# Patient Record
Sex: MALE | Race: WHITE | NOT HISPANIC OR LATINO | Employment: OTHER | URBAN - METROPOLITAN AREA
[De-identification: names, ages, dates, MRNs, and addresses within clinical notes are randomized per-mention and may not be internally consistent; named-entity substitution may affect disease eponyms.]

---

## 2023-08-15 ENCOUNTER — HOSPITAL ENCOUNTER (OUTPATIENT)
Dept: DATA CONVERSION | Facility: HOSPITAL | Age: 73
Discharge: HOME | End: 2023-08-15

## 2023-08-15 DIAGNOSIS — J44.9 CHRONIC OBSTRUCTIVE PULMONARY DISEASE, UNSPECIFIED (MULTI): ICD-10-CM

## 2023-08-15 DIAGNOSIS — E78.00 PURE HYPERCHOLESTEROLEMIA, UNSPECIFIED: ICD-10-CM

## 2023-08-15 DIAGNOSIS — E53.8 DEFICIENCY OF OTHER SPECIFIED B GROUP VITAMINS: ICD-10-CM

## 2023-08-15 DIAGNOSIS — R73.01 IMPAIRED FASTING GLUCOSE: ICD-10-CM

## 2023-08-15 LAB
ALBUMIN SERPL-MCNC: 3.9 GM/DL (ref 3.5–5)
ALBUMIN/GLOB SERPL: 1.2 RATIO (ref 1.5–3)
ALP BLD-CCNC: 105 U/L (ref 35–125)
ALT SERPL-CCNC: 25 U/L (ref 5–40)
ANION GAP SERPL CALCULATED.3IONS-SCNC: 10 MMOL/L (ref 0–19)
AST SERPL-CCNC: 30 U/L (ref 5–40)
BILIRUB SERPL-MCNC: 0.3 MG/DL (ref 0.1–1.2)
BUN SERPL-MCNC: 15 MG/DL (ref 8–25)
BUN/CREAT SERPL: 10.7 RATIO (ref 8–21)
CALCIUM SERPL-MCNC: 9.1 MG/DL (ref 8.5–10.4)
CHLORIDE SERPL-SCNC: 107 MMOL/L (ref 97–107)
CO2 SERPL-SCNC: 26 MMOL/L (ref 24–31)
CREAT SERPL-MCNC: 1.4 MG/DL (ref 0.4–1.6)
DEPRECATED RDW RBC AUTO: 45.1 FL (ref 37–54)
ERYTHROCYTE [DISTWIDTH] IN BLOOD BY AUTOMATED COUNT: 13.5 % (ref 11.7–15)
GFR SERPL CREATININE-BSD FRML MDRD: 54 ML/MIN/1.73 M2
GLOBULIN SER-MCNC: 3.2 G/DL (ref 1.9–3.7)
GLUCOSE SERPL-MCNC: 123 MG/DL (ref 65–99)
HBA1C MFR BLD: 5.8 % (ref 4–6)
HCT VFR BLD AUTO: 42.2 % (ref 41–50)
HGB BLD-MCNC: 13.7 GM/DL (ref 13.5–16.5)
LIPASE SERPL-CCNC: 22 U/L (ref 16–63)
MCH RBC QN AUTO: 29.7 PG (ref 26–34)
MCHC RBC AUTO-ENTMCNC: 32.5 % (ref 31–37)
MCV RBC AUTO: 91.3 FL (ref 80–100)
NRBC BLD-RTO: 0 /100 WBC
PLATELET # BLD AUTO: 127 K/UL (ref 150–450)
PMV BLD AUTO: 9.8 CU (ref 7–12.6)
POTASSIUM SERPL-SCNC: 4.1 MMOL/L (ref 3.4–5.1)
PROT SERPL-MCNC: 7.1 G/DL (ref 5.9–7.9)
RBC # BLD AUTO: 4.62 M/UL (ref 4.5–5.5)
SODIUM SERPL-SCNC: 143 MMOL/L (ref 133–145)
TSH SERPL DL<=0.05 MIU/L-ACNC: 1.3 MIU/L (ref 0.27–4.2)
VIT B12 SERPL-MCNC: 1004 PG/ML (ref 211–946)
WBC # BLD AUTO: 7.7 K/UL (ref 4.5–11)

## 2023-08-29 ENCOUNTER — HOSPITAL ENCOUNTER (OUTPATIENT)
Dept: DATA CONVERSION | Facility: HOSPITAL | Age: 73
End: 2023-08-29

## 2023-08-29 DIAGNOSIS — R91.1 SOLITARY PULMONARY NODULE: ICD-10-CM

## 2023-09-12 PROBLEM — M19.90 ARTHRITIS: Status: ACTIVE | Noted: 2023-09-12

## 2023-09-12 PROBLEM — M19.90 ARTHROSIS: Status: ACTIVE | Noted: 2023-09-12

## 2023-09-12 PROBLEM — E83.52 HYPERCALCEMIA: Status: ACTIVE | Noted: 2023-09-12

## 2023-09-12 PROBLEM — J84.10 PULMONARY FIBROSIS (MULTI): Status: ACTIVE | Noted: 2023-09-12

## 2023-09-12 PROBLEM — M48.061 SPINAL STENOSIS OF LUMBAR REGION: Status: ACTIVE | Noted: 2023-09-12

## 2023-09-12 PROBLEM — E04.2 NONTOXIC MULTINODULAR GOITER: Status: ACTIVE | Noted: 2023-09-12

## 2023-09-12 PROBLEM — H81.10 BPPV (BENIGN PAROXYSMAL POSITIONAL VERTIGO): Status: ACTIVE | Noted: 2023-09-12

## 2023-09-12 PROBLEM — I10 ESSENTIAL HYPERTENSION: Status: ACTIVE | Noted: 2023-09-12

## 2023-09-12 PROBLEM — F41.9 ANXIETY: Status: ACTIVE | Noted: 2023-09-12

## 2023-09-12 PROBLEM — H90.3 BILATERAL SENSORINEURAL HEARING LOSS: Status: ACTIVE | Noted: 2023-09-12

## 2023-09-12 PROBLEM — N18.30 STAGE 3 CHRONIC KIDNEY DISEASE (MULTI): Status: ACTIVE | Noted: 2023-09-12

## 2023-09-12 PROBLEM — N62 GYNECOMASTIA: Status: ACTIVE | Noted: 2023-09-12

## 2023-09-12 PROBLEM — E53.8 VITAMIN B12 DEFICIENCY (NON ANEMIC): Status: ACTIVE | Noted: 2023-09-12

## 2023-09-12 PROBLEM — F43.20 ADJUSTMENT DISORDER: Status: ACTIVE | Noted: 2023-09-12

## 2023-09-12 PROBLEM — D69.6 THROMBOCYTOPENIC DISORDER (CMS-HCC): Status: ACTIVE | Noted: 2023-09-12

## 2023-09-12 PROBLEM — K21.9 GASTROESOPHAGEAL REFLUX DISEASE: Status: ACTIVE | Noted: 2023-09-12

## 2023-09-12 PROBLEM — J44.9 CHRONIC OBSTRUCTIVE PULMONARY DISEASE (MULTI): Status: ACTIVE | Noted: 2023-09-12

## 2023-09-12 PROBLEM — E78.00 PURE HYPERCHOLESTEROLEMIA: Status: ACTIVE | Noted: 2023-09-12

## 2023-09-12 PROBLEM — F32.9 MAJOR DEPRESSIVE DISORDER, SINGLE EPISODE, UNSPECIFIED: Status: ACTIVE | Noted: 2023-09-12

## 2023-09-12 RX ORDER — LANOLIN ALCOHOL/MO/W.PET/CERES
1000 CREAM (GRAM) TOPICAL DAILY
COMMUNITY
Start: 2022-05-18

## 2023-09-12 RX ORDER — ASCORBIC ACID 500 MG
500 TABLET ORAL DAILY
COMMUNITY

## 2023-09-12 RX ORDER — METOPROLOL SUCCINATE 50 MG/1
50 TABLET, EXTENDED RELEASE ORAL DAILY
COMMUNITY
Start: 2022-07-05 | End: 2024-04-08

## 2023-09-12 RX ORDER — FAMOTIDINE 20 MG/1
2 TABLET, FILM COATED ORAL DAILY
COMMUNITY
Start: 2021-02-19 | End: 2023-10-26 | Stop reason: ALTCHOICE

## 2023-09-12 RX ORDER — ACETAMINOPHEN 500 MG
1 TABLET ORAL DAILY
COMMUNITY

## 2023-09-12 RX ORDER — BUPROPION HYDROCHLORIDE 150 MG/1
150 TABLET ORAL DAILY
COMMUNITY
End: 2023-10-26 | Stop reason: ALTCHOICE

## 2023-09-12 RX ORDER — VILAZODONE HYDROCHLORIDE 10 MG/1
10 TABLET ORAL
COMMUNITY
End: 2023-10-26 | Stop reason: ALTCHOICE

## 2023-09-12 RX ORDER — MAGNESIUM 250 MG
1 TABLET ORAL DAILY
COMMUNITY
End: 2023-10-26 | Stop reason: ALTCHOICE

## 2023-10-25 ENCOUNTER — HOSPITAL ENCOUNTER (EMERGENCY)
Facility: HOSPITAL | Age: 73
Discharge: HOME | End: 2023-10-25
Attending: EMERGENCY MEDICINE
Payer: MEDICARE

## 2023-10-25 ENCOUNTER — APPOINTMENT (OUTPATIENT)
Dept: RADIOLOGY | Facility: HOSPITAL | Age: 73
End: 2023-10-25
Payer: MEDICARE

## 2023-10-25 VITALS
WEIGHT: 160 LBS | HEIGHT: 68 IN | TEMPERATURE: 98.1 F | SYSTOLIC BLOOD PRESSURE: 141 MMHG | RESPIRATION RATE: 20 BRPM | OXYGEN SATURATION: 98 % | DIASTOLIC BLOOD PRESSURE: 74 MMHG | BODY MASS INDEX: 24.25 KG/M2 | HEART RATE: 70 BPM

## 2023-10-25 DIAGNOSIS — M54.50 ACUTE LEFT-SIDED LOW BACK PAIN WITHOUT SCIATICA: Primary | ICD-10-CM

## 2023-10-25 PROCEDURE — 2500000004 HC RX 250 GENERAL PHARMACY W/ HCPCS (ALT 636 FOR OP/ED): Performed by: EMERGENCY MEDICINE

## 2023-10-25 PROCEDURE — 2500000001 HC RX 250 WO HCPCS SELF ADMINISTERED DRUGS (ALT 637 FOR MEDICARE OP): Performed by: EMERGENCY MEDICINE

## 2023-10-25 PROCEDURE — 99284 EMERGENCY DEPT VISIT MOD MDM: CPT | Mod: 25

## 2023-10-25 PROCEDURE — 72131 CT LUMBAR SPINE W/O DYE: CPT | Mod: ME

## 2023-10-25 RX ORDER — HYDROCODONE BITARTRATE AND ACETAMINOPHEN 5; 325 MG/1; MG/1
1 TABLET ORAL ONCE
Status: COMPLETED | OUTPATIENT
Start: 2023-10-25 | End: 2023-10-25

## 2023-10-25 RX ORDER — DEXAMETHASONE 6 MG/1
12 TABLET ORAL ONCE
Status: COMPLETED | OUTPATIENT
Start: 2023-10-25 | End: 2023-10-25

## 2023-10-25 RX ORDER — CYCLOBENZAPRINE HCL 5 MG
5 TABLET ORAL 2 TIMES DAILY PRN
Qty: 10 TABLET | Refills: 0 | Status: SHIPPED | OUTPATIENT
Start: 2023-10-25

## 2023-10-25 RX ADMIN — HYDROCODONE BITARTRATE AND ACETAMINOPHEN 1 TABLET: 5; 325 TABLET ORAL at 14:08

## 2023-10-25 RX ADMIN — DEXAMETHASONE 12 MG: 6 TABLET ORAL at 14:07

## 2023-10-25 ASSESSMENT — PAIN DESCRIPTION - ORIENTATION: ORIENTATION: LEFT;LOWER

## 2023-10-25 ASSESSMENT — COLUMBIA-SUICIDE SEVERITY RATING SCALE - C-SSRS
1. IN THE PAST MONTH, HAVE YOU WISHED YOU WERE DEAD OR WISHED YOU COULD GO TO SLEEP AND NOT WAKE UP?: NO
2. HAVE YOU ACTUALLY HAD ANY THOUGHTS OF KILLING YOURSELF?: NO
6. HAVE YOU EVER DONE ANYTHING, STARTED TO DO ANYTHING, OR PREPARED TO DO ANYTHING TO END YOUR LIFE?: NO

## 2023-10-25 ASSESSMENT — PAIN DESCRIPTION - PROGRESSION: CLINICAL_PROGRESSION: GRADUALLY WORSENING

## 2023-10-25 ASSESSMENT — PAIN DESCRIPTION - PAIN TYPE: TYPE: ACUTE PAIN

## 2023-10-25 ASSESSMENT — PAIN SCALES - GENERAL
PAINLEVEL_OUTOF10: 5 - MODERATE PAIN
PAINLEVEL_OUTOF10: 9

## 2023-10-25 ASSESSMENT — PAIN DESCRIPTION - ONSET: ONSET: GRADUAL

## 2023-10-25 ASSESSMENT — PAIN DESCRIPTION - DESCRIPTORS: DESCRIPTORS: SHARP

## 2023-10-25 ASSESSMENT — PAIN DESCRIPTION - LOCATION: LOCATION: BACK

## 2023-10-25 ASSESSMENT — PAIN - FUNCTIONAL ASSESSMENT: PAIN_FUNCTIONAL_ASSESSMENT: 0-10

## 2023-10-25 ASSESSMENT — PAIN DESCRIPTION - FREQUENCY: FREQUENCY: CONSTANT/CONTINUOUS

## 2023-10-25 NOTE — ED PROVIDER NOTES
Pt Name: Kar Massey  MRN: [id number]  Birthdate 1950  Date of evaluation: 10/25/2023  [facility]      CHIEF COMPLAINT    Chief Complaint   Patient presents with    Back Pain        HPI  73 y.o. male presents with With left-sided low back pain symptoms started approximately 5 days ago.  Pain radiates to the left groin.  No recent injury.  No trauma.  No weakness no numbness.  No difficulty urinating no bowel incontinence.  REVIEW OF SYSTEMS     Review of Systems    Pmh:  Patient Active Problem List   Diagnosis    Adjustment disorder    Anxiety    Arthritis    Arthrosis    Bilateral sensorineural hearing loss    BPPV (benign paroxysmal positional vertigo)    Chronic obstructive pulmonary disease (CMS/HCC)    Essential hypertension    Gastroesophageal reflux disease    Gynecomastia    Hypercalcemia    Major depressive disorder, single episode, unspecified    Nontoxic multinodular goiter    Pulmonary fibrosis (CMS/HCC)    Pure hypercholesterolemia    Spinal stenosis of lumbar region    Stage 3 chronic kidney disease (CMS/HCC)    Thrombocytopenic disorder (CMS/HCC)    Vitamin B12 deficiency (non anemic)       CURRENT MEDICATIONS       Previous Medications    ASCORBIC ACID (VITAMIN C) 500 MG TABLET    Take 1 tablet (500 mg) by mouth once daily.    BUPROPION XL (WELLBUTRIN XL) 150 MG 24 HR TABLET    Take 1 tablet (150 mg) by mouth once daily.    CHOLECALCIFEROL (VITAMIN D-3) 50 MCG (2,000 UNIT) CAPSULE    Take 1 capsule (50 mcg) by mouth early in the morning..    CYANOCOBALAMIN (VITAMIN B-12) 1,000 MCG TABLET    Take 1 tablet (1,000 mcg) by mouth once daily.    FAMOTIDINE (PEPCID) 20 MG TABLET    Take 2 tablets (40 mg) by mouth once daily.    MAGNESIUM 250 MG TABLET    Take 1 tablet (250 mg) by mouth once daily.    METOPROLOL SUCCINATE XL (TOPROL-XL) 50 MG 24 HR TABLET    Take 1 tablet (50 mg) by mouth once daily.    VILAZODONE (VIIBRYD) 10 MG TABLET    Take 1 tablet (10 mg) by mouth once daily with a meal.  "      Family History   Problem Relation Name Age of Onset    Hypertension Mother      Diabetes Mother      Other (Bladder Cancer) Father      Heart disease Father         Social Determinants of Health     Tobacco Use: Not on file   Alcohol Use: Not on file   Financial Resource Strain: Not on file   Food Insecurity: Not on file   Transportation Needs: Not on file   Physical Activity: Not on file   Stress: Not on file   Social Connections: Not on file   Intimate Partner Violence: Not on file   Depression: Not on file   Housing Stability: Not on file   Utilities: Not on file   Digital Equity: Not on file       Physical Exam  Vitals and nursing note reviewed.   Constitutional:       Appearance: He is normal weight. He is not toxic-appearing.   Musculoskeletal:      Thoracic back: No tenderness.      Lumbar back: No deformity or lacerations. Negative right straight leg raise test and negative left straight leg raise test.      Left hip: No lacerations, tenderness or crepitus. Normal range of motion.   Skin:     General: Skin is warm.      Coloration: Skin is not jaundiced or pale.   Neurological:      Mental Status: He is alert.      Sensory: Sensation is intact.      Motor: Motor function is intact.      Gait: Gait is intact.      Deep Tendon Reflexes: Reflexes are normal and symmetric.   Psychiatric:         Behavior: Behavior normal.         Thought Content: Thought content normal.       Vitals:    10/25/23 1218 10/25/23 1221   BP: 141/74    BP Location: Right arm    Patient Position: Sitting    Pulse: 70    Resp: 20    Temp: 36.7 °C (98.1 °F)    TempSrc: Oral    SpO2: 98% 98%   Weight: 72.6 kg (160 lb)    Height: 1.727 m (5' 8\")              SCREENINGS        Medical Decision Making       Imgaing:  CT lumbar spine wo IV contrast   Final Result   1. Degenerative changes of the lumbar spine: Bilateral pars defects   at L5 cause grade 1 anterolisthesis of L5 on S1, with resulting mild   bilateral foraminal stenosis at " L5-S1. Additionally, grade 1   anterolisthesis of L4 on L5 causes mild bilateral foraminal at L4-L5.   No spinal canal stenosis.        2. Findings involving the pancreas suggest prior episodes of   pancreatitis.        3. Sigmoid diverticulosis.        MACRO:   None        Signed by: Dakotah Hay 10/25/2023 2:46 PM   Dictation workstation:   [id number]          Labs:      EKG:  No orders to display       Medications ordered:  Medications   dexAMETHasone (Decadron) tablet 12 mg (12 mg oral Given 10/25/23 1407)   HYDROcodone-acetaminophen (Norco) 5-325 mg per tablet 1 tablet (1 tablet oral Given 10/25/23 1408)           Orders placed during visit:  CT lumbar spine wo IV contrast   Final Result   1. Degenerative changes of the lumbar spine: Bilateral pars defects   at L5 cause grade 1 anterolisthesis of L5 on S1, with resulting mild   bilateral foraminal stenosis at L5-S1. Additionally, grade 1   anterolisthesis of L4 on L5 causes mild bilateral foraminal at L4-L5.   No spinal canal stenosis.        2. Findings involving the pancreas suggest prior episodes of   pancreatitis.        3. Sigmoid diverticulosis.        MACRO:   None        Signed by: Dakotah Hay 10/25/2023 2:46 PM   Dictation workstation:   [id number]          Diagnoses as of 10/25/23 1609   Acute left-sided low back pain without sciatica       CONSULTS:  None    CRITICAL CARE TIME          CT reviewed no AAA no fracture.  Likely radiculopathy from neural foramina stenosis      Clinical impression:  1. Acute left-sided low back pain without sciatica  cyclobenzaprine (Flexeril) 5 mg tablet          DISPOSITION/PLAN   DISPOSITION Discharge 10/25/2023 04:05:42 PM       I prescribed the following medications:  New Prescriptions    CYCLOBENZAPRINE (FLEXERIL) 5 MG TABLET    Take 1 tablet (5 mg) by mouth 2 times a day as needed for muscle spasms.       PATIENT REFERRED TO:  Edward Gagnon MD  [address] [place]  [place] [address] [phone]    In 4  days           Hiram Scott MD  10/25/23 6005

## 2023-10-26 ENCOUNTER — OFFICE VISIT (OUTPATIENT)
Dept: PRIMARY CARE | Facility: CLINIC | Age: 73
End: 2023-10-26
Payer: MEDICARE

## 2023-10-26 ENCOUNTER — LAB (OUTPATIENT)
Dept: LAB | Facility: LAB | Age: 73
End: 2023-10-26
Payer: MEDICARE

## 2023-10-26 VITALS
SYSTOLIC BLOOD PRESSURE: 120 MMHG | WEIGHT: 167 LBS | HEART RATE: 65 BPM | BODY MASS INDEX: 25.39 KG/M2 | OXYGEN SATURATION: 96 % | DIASTOLIC BLOOD PRESSURE: 60 MMHG

## 2023-10-26 DIAGNOSIS — E04.2 NONTOXIC MULTINODULAR GOITER: ICD-10-CM

## 2023-10-26 DIAGNOSIS — N18.31 STAGE 3A CHRONIC KIDNEY DISEASE (MULTI): ICD-10-CM

## 2023-10-26 DIAGNOSIS — D64.9 ANEMIA, UNSPECIFIED TYPE: ICD-10-CM

## 2023-10-26 DIAGNOSIS — J43.1 PANLOBULAR EMPHYSEMA (MULTI): ICD-10-CM

## 2023-10-26 DIAGNOSIS — G31.84 MCI (MILD COGNITIVE IMPAIRMENT): ICD-10-CM

## 2023-10-26 DIAGNOSIS — E53.8 VITAMIN B12 DEFICIENCY (NON ANEMIC): ICD-10-CM

## 2023-10-26 DIAGNOSIS — Z87.19 HISTORY OF DUODENAL ULCER: ICD-10-CM

## 2023-10-26 DIAGNOSIS — K21.9 GASTROESOPHAGEAL REFLUX DISEASE WITHOUT ESOPHAGITIS: ICD-10-CM

## 2023-10-26 DIAGNOSIS — M19.90 ARTHRITIS: ICD-10-CM

## 2023-10-26 DIAGNOSIS — J84.10 PULMONARY FIBROSIS (MULTI): ICD-10-CM

## 2023-10-26 DIAGNOSIS — E78.00 PURE HYPERCHOLESTEROLEMIA: ICD-10-CM

## 2023-10-26 DIAGNOSIS — Z12.5 SCREENING FOR PROSTATE CANCER: ICD-10-CM

## 2023-10-26 DIAGNOSIS — Z87.19 HISTORY OF COMMON DUODENAL ULCER: ICD-10-CM

## 2023-10-26 DIAGNOSIS — I12.9 BENIGN HYPERTENSIVE KIDNEY DISEASE WITH CHRONIC KIDNEY DISEASE STAGE I THROUGH STAGE IV, OR UNSPECIFIED(403.10): ICD-10-CM

## 2023-10-26 DIAGNOSIS — M54.16 LUMBAR RADICULOPATHY: Primary | ICD-10-CM

## 2023-10-26 LAB
ANION GAP SERPL CALC-SCNC: 14 MMOL/L
BUN SERPL-MCNC: 17 MG/DL (ref 8–25)
CALCIUM SERPL-MCNC: 9.4 MG/DL (ref 8.5–10.4)
CHLORIDE SERPL-SCNC: 107 MMOL/L (ref 97–107)
CO2 SERPL-SCNC: 22 MMOL/L (ref 24–31)
CREAT SERPL-MCNC: 1.4 MG/DL (ref 0.4–1.6)
ERYTHROCYTE [DISTWIDTH] IN BLOOD BY AUTOMATED COUNT: 12.6 % (ref 11.5–14.5)
FERRITIN SERPL-MCNC: 13 NG/ML (ref 30–400)
GFR SERPL CREATININE-BSD FRML MDRD: 54 ML/MIN/1.73M*2
GLUCOSE SERPL-MCNC: 94 MG/DL (ref 65–99)
HCT VFR BLD AUTO: 38.1 % (ref 41–52)
HGB BLD-MCNC: 12 G/DL (ref 13.5–17.5)
MCH RBC QN AUTO: 28.5 PG (ref 26–34)
MCHC RBC AUTO-ENTMCNC: 31.5 G/DL (ref 32–36)
MCV RBC AUTO: 91 FL (ref 80–100)
NRBC BLD-RTO: 0 /100 WBCS (ref 0–0)
PLATELET # BLD AUTO: 173 X10*3/UL (ref 150–450)
PMV BLD AUTO: 10.4 FL (ref 7.5–11.5)
POTASSIUM SERPL-SCNC: 4.1 MMOL/L (ref 3.4–5.1)
RBC # BLD AUTO: 4.21 X10*6/UL (ref 4.5–5.9)
SODIUM SERPL-SCNC: 143 MMOL/L (ref 133–145)
WBC # BLD AUTO: 13.2 X10*3/UL (ref 4.4–11.3)

## 2023-10-26 PROCEDURE — 82728 ASSAY OF FERRITIN: CPT

## 2023-10-26 PROCEDURE — 1036F TOBACCO NON-USER: CPT | Performed by: INTERNAL MEDICINE

## 2023-10-26 PROCEDURE — 80048 BASIC METABOLIC PNL TOTAL CA: CPT

## 2023-10-26 PROCEDURE — 99213 OFFICE O/P EST LOW 20 MIN: CPT | Performed by: INTERNAL MEDICINE

## 2023-10-26 PROCEDURE — 1159F MED LIST DOCD IN RCRD: CPT | Performed by: INTERNAL MEDICINE

## 2023-10-26 PROCEDURE — 1125F AMNT PAIN NOTED PAIN PRSNT: CPT | Performed by: INTERNAL MEDICINE

## 2023-10-26 PROCEDURE — 3078F DIAST BP <80 MM HG: CPT | Performed by: INTERNAL MEDICINE

## 2023-10-26 PROCEDURE — 36415 COLL VENOUS BLD VENIPUNCTURE: CPT

## 2023-10-26 PROCEDURE — 3074F SYST BP LT 130 MM HG: CPT | Performed by: INTERNAL MEDICINE

## 2023-10-26 PROCEDURE — 85027 COMPLETE CBC AUTOMATED: CPT

## 2023-10-26 RX ORDER — PANTOPRAZOLE SODIUM 40 MG/1
40 TABLET, DELAYED RELEASE ORAL
COMMUNITY
End: 2024-03-11 | Stop reason: SDUPTHER

## 2023-10-26 RX ORDER — ESCITALOPRAM OXALATE 10 MG/1
10 TABLET ORAL DAILY
COMMUNITY
End: 2024-03-11 | Stop reason: SDUPTHER

## 2023-10-26 ASSESSMENT — PATIENT HEALTH QUESTIONNAIRE - PHQ9
2. FEELING DOWN, DEPRESSED OR HOPELESS: NOT AT ALL
SUM OF ALL RESPONSES TO PHQ9 QUESTIONS 1 AND 2: 0
1. LITTLE INTEREST OR PLEASURE IN DOING THINGS: NOT AT ALL

## 2023-10-26 ASSESSMENT — ENCOUNTER SYMPTOMS
DEPRESSION: 1
SHORTNESS OF BREATH: 0
OCCASIONAL FEELINGS OF UNSTEADINESS: 1
PALPITATIONS: 0
LOSS OF SENSATION IN FEET: 0

## 2023-10-26 ASSESSMENT — PAIN SCALES - GENERAL: PAINLEVEL: 9

## 2023-10-26 NOTE — PATIENT INSTRUCTIONS
Diagnoses and all orders for this visit:  Lumbar radiculopathy  Comments:  Medically clear for MRI of L/S spine pain management . No contrast with MRI with kidney disease.  Benign hypertensive kidney disease with chronic kidney disease stage I through stage IV, or unspecified(403.10)  Stage 3a chronic kidney disease (CMS/HCC)  Comments:  No contrast with CT/MRI scans with chronic kidney disease,no NSAIDs, Tylenol, max dose 3000 mg in 24 hrs in devided doses.  Orders:  -     CBC; Future  -     Basic metabolic panel; Future  History of common duodenal ulcer  Panlobular emphysema (CMS/HCC)  Pulmonary fibrosis (CMS/HCC)  Arthritis  Gastroesophageal reflux disease without esophagitis  Vitamin B12 deficiency (non anemic)  Nontoxic multinodular goiter  Pure hypercholesterolemia  Screening for prostate cancer  MCI (mild cognitive impairment)  Comments:  seeing .  History of duodenal ulcer  Comments:   for recheck on scope EGD. as directed. ON PPI, recheck labs.  Orders:  -     Iron and TIBC; Future  -     Ferritin; Future  Anemia, unspecified type  -     Iron and TIBC; Future  -     Ferritin; Future  Other orders  -     Follow Up In Primary Care - Established; Future

## 2023-10-26 NOTE — PROGRESS NOTES
[facility]: [facility]  PROGRESS NOTE      Kar Massey is a 73 y.o. male that is presenting today for Follow-up (Pt went to [facility] for back pain, came back with pinch nerve ).    Assessment/Plan   Diagnoses and all orders for this visit:  Lumbar radiculopathy  Comments:  Medically clear for MRI of L/S spine pain management . No contrast with MRI with kidney disease.  Benign hypertensive kidney disease with chronic kidney disease stage I through stage IV, or unspecified(403.10)  Stage 3a chronic kidney disease (CMS/HCC)  Comments:  No contrast with CT/MRI scans with chronic kidney disease,no NSAIDs, Tylenol, max dose 3000 mg in 24 hrs in devided doses.  Orders:  -     CBC; Future  -     Basic metabolic panel; Future  History of common duodenal ulcer  Panlobular emphysema (CMS/HCC)  Pulmonary fibrosis (CMS/HCC)  Arthritis  Gastroesophageal reflux disease without esophagitis  Vitamin B12 deficiency (non anemic)  Nontoxic multinodular goiter  Pure hypercholesterolemia  Screening for prostate cancer  MCI (mild cognitive impairment)  Comments:  seeing .  History of duodenal ulcer  Comments:   for recheck on scope EGD. as directed. ON PPI, recheck labs.  Orders:  -     Iron and TIBC; Future  -     Ferritin; Future  Anemia, unspecified type  -     Iron and TIBC; Future  -     Ferritin; Future  Other orders  -     Follow Up In Primary Care - Established; Future    Subjective   Here today with low back pain down left leg. LR. To see [facility]  pain management. Needs clearance for MRI, no contrast..      Review of Systems   Respiratory:  Negative for shortness of breath.    Cardiovascular:  Negative for chest pain and palpitations.   All other systems reviewed and are negative.     Objective   Vitals:    10/26/23 1503   BP: 120/60   Pulse: 65   SpO2: 96%      Body mass index is 25.39 kg/m².  Physical Exam  Constitutional:       General: He is not in acute  distress.     Appearance: Normal appearance.      Comments: Using transfer wheelchair for LR.   HENT:      Head: Normocephalic and atraumatic.      Right Ear: Tympanic membrane normal.      Left Ear: Tympanic membrane normal.      Mouth/Throat:      Mouth: Mucous membranes are moist.      Pharynx: Oropharynx is clear.   Eyes:      Extraocular Movements: Extraocular movements intact.      Conjunctiva/sclera: Conjunctivae normal.      Pupils: Pupils are equal, round, and reactive to light.   Cardiovascular:      Rate and Rhythm: Normal rate and regular rhythm.   Pulmonary:      Breath sounds: Normal breath sounds.   Abdominal:      General: Bowel sounds are normal.      Palpations: Abdomen is soft. There is no mass.   Musculoskeletal:         General: Normal range of motion.      Cervical back: Neck supple. No tenderness.   Skin:     General: Skin is warm and dry.   Neurological:      General: No focal deficit present.      Mental Status: He is oriented to person, place, and time.      Comments: Left leg pos straigh leg raise on left.       Diagnostic Results   Lab Results   Component Value Date    GLUCOSE 97 08/25/2023    CALCIUM 8.3 (L) 08/25/2023     08/25/2023    K 3.6 08/25/2023    CO2 24 08/25/2023     (H) 08/25/2023    BUN 14 08/25/2023    CREATININE 1.3 08/25/2023     Lab Results   Component Value Date    ALT 29 08/25/2023    AST 24 08/25/2023    ALKPHOS 48 08/25/2023    BILITOT 0.3 08/25/2023     Lab Results   Component Value Date    WBC 8.5 08/25/2023    HGB 8.3 (L) 08/25/2023    HCT 24.4 (L) 08/25/2023    MCV 89.4 08/25/2023     (L) 08/25/2023     Lab Results   Component Value Date    CHOL 95 (L) 08/21/2023    CHOL 121 (L) 01/24/2023    CHOL 115 (L) 11/19/2021     Lab Results   Component Value Date    HDL 48 08/21/2023    HDL 48 01/24/2023    HDL 64 11/19/2021     Lab Results   Component Value Date    LDLCALC 35 (L) 08/21/2023    LDLCALC 55 (L) 01/24/2023    LDLCALC 45 (L) 11/19/2021  "    Lab Results   Component Value Date    TRIG 60 08/21/2023    TRIG 91 01/24/2023    TRIG 32 (L) 11/19/2021     No components found for: \"CHOLHDL\"  Lab Results   Component Value Date    HGBA1C 5.8 08/15/2023     Other labs not included in the list above were reviewed either before or during this encounter.    History    Past Medical History:   Diagnosis Date    Arthritis 09/12/2023    Arthritis 09/12/2023     rheumatology    B12 deficiency     Benign hypertensive kidney disease with chronic kidney disease stage I through stage IV, or unspecified(403.10)     Bilateral sensorineural hearing loss 09/12/2023    Depression with anxiety     [facility] for memory loss rec 9/23, 10/23.    Gastroesophageal reflux disease 09/12/2023    History of duodenal ulcer     Dr.Gallahger tolentino/aren up 10/23 rec. no NSAIDs    Pulmonary fibrosis (CMS/HCC) 09/12/2023    Screening for prostate cancer 10/26/2023    1/23 PSA 0.3    Spinal stenosis of lumbar region 09/12/2023    Stage 3a chronic kidney disease (CMS/HCC)     SPEP, renal u/S 4/19 nl.    Thyroid nodule     Dr..Bergun anguiano seen 5/23 no change U/S TSH nl 9/22     Past Surgical History:   Procedure Laterality Date    OTHER SURGICAL HISTORY  06/14/2021    Rotator cuff repair    OTHER SURGICAL HISTORY  06/14/2021    Hernia repair    OTHER SURGICAL HISTORY  09/22/2022    Eye surgery     Family History   Problem Relation Name Age of Onset    Hypertension Mother      Diabetes Mother      Other (Bladder Cancer) Father      Heart disease Father       Social History     Socioeconomic History    Marital status:      Spouse name: Not on file    Number of children: Not on file    Years of education: Not on file    Highest education level: Not on file   Occupational History    Not on file   Tobacco Use    Smoking status: Never    Smokeless tobacco: Never   Substance and Sexual Activity    Alcohol use: Never    Drug use: Yes     Types: Marijuana     Comment: " sometimes    Sexual activity: Not on file   Other Topics Concern    Not on file   Social History Narrative    Not on file     Social Determinants of Health     Financial Resource Strain: Not on file   Food Insecurity: Not on file   Transportation Needs: Not on file   Physical Activity: Not on file   Stress: Not on file   Social Connections: Not on file   Intimate Partner Violence: Not on file   Housing Stability: Not on file     Allergies   Allergen Reactions    Penicillins Chills    Budesonide Unknown    Codeine Unknown    Diclofenac Sodium Unknown    Diltiazem Hcl Other     gynecomastai    Metaxalone Unknown    Tramadol Unknown     Current Outpatient Medications on File Prior to Visit   Medication Sig Dispense Refill    ascorbic acid (Vitamin C) 500 mg tablet Take 1 tablet (500 mg) by mouth once daily.      cholecalciferol (Vitamin D-3) 50 mcg (2,000 unit) capsule Take 1 capsule (2,000 Units) by mouth early in the morning..      cyanocobalamin (Vitamin B-12) 1,000 mcg tablet Take 1 tablet (1,000 mcg) by mouth once daily.      cyclobenzaprine (Flexeril) 5 mg tablet Take 1 tablet (5 mg) by mouth 2 times a day as needed for muscle spasms. 10 tablet 0    metoprolol succinate XL (Toprol-XL) 50 mg 24 hr tablet Take 1 tablet (50 mg) by mouth once daily.      pantoprazole (ProtoNix) 40 mg EC tablet Take 1 tablet (40 mg) by mouth once daily in the morning. Take before meals. Do not crush, chew, or split.      escitalopram (Lexapro) 10 mg tablet Take 1 tablet (10 mg) by mouth once daily.      [DISCONTINUED] buPROPion XL (Wellbutrin XL) 150 mg 24 hr tablet Take 1 tablet (150 mg) by mouth once daily.      [DISCONTINUED] famotidine (Pepcid) 20 mg tablet Take 2 tablets (40 mg) by mouth once daily.      [DISCONTINUED] magnesium 250 mg tablet Take 1 tablet (250 mg) by mouth once daily.      [DISCONTINUED] vilazodone (Viibryd) 10 mg tablet Take 1 tablet (10 mg) by mouth once daily with a meal.       No current  facility-administered medications on file prior to visit.     Immunization History   Administered Date(s) Administered    Flu vaccine, quadrivalent, high-dose, preservative free, age 65y+ (FLUZONE) 11/02/2020, 10/19/2022    Influenza, injectable, quadrivalent 10/17/2018, 10/25/2019    Moderna SARS-CoV-2 Vaccination 03/25/2021, 04/22/2021    Pneumococcal conjugate vaccine, 13-valent (PREVNAR 13) 02/15/2018    Pneumococcal polysaccharide vaccine, 23-valent, age 2 years and older (PNEUMOVAX 23) 10/25/2019    Tdap vaccine, age 7 year and older (BOOSTRIX) 05/04/2015, 06/22/2020     Patient's medical history was reviewed and updated either before or during this encounter.    Edward Gagnon MD

## 2023-10-27 ENCOUNTER — TELEPHONE (OUTPATIENT)
Dept: PRIMARY CARE | Facility: CLINIC | Age: 73
End: 2023-10-27
Payer: MEDICARE

## 2023-10-27 NOTE — TELEPHONE ENCOUNTER
----- Message from Edward Gagnon MD sent at 10/27/2023  6:50 AM EDT -----  Mild anemia, slight increase in WBC, CKD III BMP stable. Fax labs with report to clear for MRI with NO contrast. TO [facility] as directed.

## 2023-10-27 NOTE — TELEPHONE ENCOUNTER
All required paperwork and labs completed and faxed to [facility] at [phone] as requested

## 2024-01-09 ENCOUNTER — OFFICE VISIT (OUTPATIENT)
Dept: PRIMARY CARE | Facility: CLINIC | Age: 74
End: 2024-01-09
Payer: MEDICARE

## 2024-01-09 VITALS
HEIGHT: 68 IN | SYSTOLIC BLOOD PRESSURE: 115 MMHG | TEMPERATURE: 97.9 F | DIASTOLIC BLOOD PRESSURE: 73 MMHG | BODY MASS INDEX: 26.67 KG/M2 | WEIGHT: 176 LBS | HEART RATE: 71 BPM | OXYGEN SATURATION: 98 %

## 2024-01-09 DIAGNOSIS — F03.B0 MODERATE DEMENTIA WITHOUT BEHAVIORAL DISTURBANCE, PSYCHOTIC DISTURBANCE, MOOD DISTURBANCE, OR ANXIETY, UNSPECIFIED DEMENTIA TYPE (MULTI): ICD-10-CM

## 2024-01-09 DIAGNOSIS — K21.9 GASTROESOPHAGEAL REFLUX DISEASE WITHOUT ESOPHAGITIS: ICD-10-CM

## 2024-01-09 DIAGNOSIS — E78.00 PURE HYPERCHOLESTEROLEMIA: Primary | ICD-10-CM

## 2024-01-09 DIAGNOSIS — M48.062 SPINAL STENOSIS OF LUMBAR REGION WITH NEUROGENIC CLAUDICATION: ICD-10-CM

## 2024-01-09 DIAGNOSIS — J44.1 CHRONIC OBSTRUCTIVE PULMONARY DISEASE WITH ACUTE EXACERBATION (MULTI): ICD-10-CM

## 2024-01-09 DIAGNOSIS — D69.6 THROMBOCYTOPENIC DISORDER (CMS-HCC): ICD-10-CM

## 2024-01-09 DIAGNOSIS — Z12.5 SCREENING FOR PROSTATE CANCER: ICD-10-CM

## 2024-01-09 DIAGNOSIS — F32.4 MAJOR DEPRESSIVE DISORDER WITH SINGLE EPISODE, IN PARTIAL REMISSION (CMS-HCC): ICD-10-CM

## 2024-01-09 DIAGNOSIS — N18.31 STAGE 3A CHRONIC KIDNEY DISEASE (MULTI): ICD-10-CM

## 2024-01-09 DIAGNOSIS — R73.01 IMPAIRED FASTING BLOOD SUGAR: ICD-10-CM

## 2024-01-09 DIAGNOSIS — E04.1 THYROID NODULE: ICD-10-CM

## 2024-01-09 DIAGNOSIS — J84.10 PULMONARY FIBROSIS (MULTI): ICD-10-CM

## 2024-01-09 DIAGNOSIS — I12.9 BENIGN HYPERTENSIVE KIDNEY DISEASE WITH CHRONIC KIDNEY DISEASE STAGE I THROUGH STAGE IV, OR UNSPECIFIED(403.10): ICD-10-CM

## 2024-01-09 PROBLEM — F02.B0 MODERATE EARLY ONSET ALZHEIMER'S DEMENTIA WITHOUT BEHAVIORAL DISTURBANCE, PSYCHOTIC DISTURBANCE, MOOD DISTURBANCE, OR ANXIETY (MULTI): Status: ACTIVE | Noted: 2024-01-09

## 2024-01-09 PROBLEM — G30.0 MODERATE EARLY ONSET ALZHEIMER'S DEMENTIA WITHOUT BEHAVIORAL DISTURBANCE, PSYCHOTIC DISTURBANCE, MOOD DISTURBANCE, OR ANXIETY (MULTI): Status: ACTIVE | Noted: 2024-01-09

## 2024-01-09 PROCEDURE — 99214 OFFICE O/P EST MOD 30 MIN: CPT | Performed by: INTERNAL MEDICINE

## 2024-01-09 PROCEDURE — 1036F TOBACCO NON-USER: CPT | Performed by: INTERNAL MEDICINE

## 2024-01-09 PROCEDURE — 1125F AMNT PAIN NOTED PAIN PRSNT: CPT | Performed by: INTERNAL MEDICINE

## 2024-01-09 PROCEDURE — 3074F SYST BP LT 130 MM HG: CPT | Performed by: INTERNAL MEDICINE

## 2024-01-09 PROCEDURE — 3078F DIAST BP <80 MM HG: CPT | Performed by: INTERNAL MEDICINE

## 2024-01-09 PROCEDURE — 1159F MED LIST DOCD IN RCRD: CPT | Performed by: INTERNAL MEDICINE

## 2024-01-09 RX ORDER — GABAPENTIN 100 MG/1
100 CAPSULE ORAL 3 TIMES DAILY
COMMUNITY

## 2024-01-09 RX ORDER — DONEPEZIL HYDROCHLORIDE 5 MG/1
5 TABLET, FILM COATED ORAL NIGHTLY
COMMUNITY

## 2024-01-09 RX ORDER — TAMSULOSIN HYDROCHLORIDE 0.4 MG/1
0.4 CAPSULE ORAL DAILY
COMMUNITY

## 2024-01-09 ASSESSMENT — ENCOUNTER SYMPTOMS
SHORTNESS OF BREATH: 0
PALPITATIONS: 0
DEPRESSION: 0
OCCASIONAL FEELINGS OF UNSTEADINESS: 1
LOSS OF SENSATION IN FEET: 0

## 2024-01-09 ASSESSMENT — PATIENT HEALTH QUESTIONNAIRE - PHQ9
SUM OF ALL RESPONSES TO PHQ9 QUESTIONS 1 AND 2: 0
2. FEELING DOWN, DEPRESSED OR HOPELESS: NOT AT ALL
1. LITTLE INTEREST OR PLEASURE IN DOING THINGS: NOT AT ALL

## 2024-01-09 ASSESSMENT — PAIN SCALES - GENERAL: PAINLEVEL: 3

## 2024-01-09 NOTE — PROGRESS NOTES
[facility]: [facility]  PROGRESS NOTE      Kar Massey is a 73 y.o. male that is presenting today for Follow-up (4 mo fu).    Assessment/Plan   Diagnoses and all orders for this visit:  Pure hypercholesterolemia  -     Lipid Panel; Future  -     Comprehensive Metabolic Panel; Future  Thrombocytopenic disorder (CMS/HCC)  Stage 3a chronic kidney disease (CMS/HCC)  Pulmonary fibrosis (CMS/HCC)  Chronic obstructive pulmonary disease with acute exacerbation (CMS/HCC)  Spinal stenosis of lumbar region with neurogenic claudication  Comments:   #2 epidural 1/5/24. PT through [facility].  Gastroesophageal reflux disease without esophagitis  Moderate dementia without behavioral disturbance, psychotic disturbance, mood disturbance, or anxiety, unspecified dementia type (CMS/HCC)  Comments:  DR.Tessman ray, [place], MRI brain ordered by neurology.  Screening for prostate cancer  -     Prostate Specific Antigen; Future  Impaired fasting blood sugar  -     Hemoglobin A1C; Future  -     CBC; Future  Major depressive disorder with single episode, in partial remission (CMS/HCC)  Thyroid nodule  -     TSH with reflex to Free T4 if abnormal; Future  Benign hypertensive kidney disease with chronic kidney disease stage I through stage IV, or unspecified(403.10)  Comments:  BP doing OK  Other orders  -     Follow Up In Primary Care - Established    Subjective   Lumbar stenosi,  # 2, Dr.Tessman Lew, wished to have MRI brain ordered. COPD, rec no driving.      Review of Systems   Respiratory:  Negative for shortness of breath.    Cardiovascular:  Negative for chest pain and palpitations.   All other systems reviewed and are negative.     Objective   Vitals:    01/09/24 1457   BP: 115/73   Pulse: 71   Temp: 36.6 °C (97.9 °F)   SpO2: 98%      Body mass index is 26.76 kg/m².  Physical Exam  Constitutional:       General: He is not in acute distress.     Comments: cane   HENT:       Head: Normocephalic and atraumatic.      Right Ear: Tympanic membrane normal.      Left Ear: Tympanic membrane normal.      Mouth/Throat:      Mouth: Mucous membranes are moist.      Pharynx: Oropharynx is clear.   Eyes:      Extraocular Movements: Extraocular movements intact.      Conjunctiva/sclera: Conjunctivae normal.      Pupils: Pupils are equal, round, and reactive to light.   Cardiovascular:      Rate and Rhythm: Normal rate and regular rhythm.   Pulmonary:      Breath sounds: Normal breath sounds.   Abdominal:      General: Bowel sounds are normal.      Palpations: Abdomen is soft. There is no mass.   Musculoskeletal:         General: Normal range of motion.      Cervical back: Neck supple. No tenderness.   Skin:     General: Skin is warm and dry.   Neurological:      General: No focal deficit present.      Mental Status: He is oriented to person, place, and time.      Sensory: Sensory deficit present.       Diagnostic Results   Lab Results   Component Value Date    GLUCOSE 94 10/26/2023    CALCIUM 9.4 10/26/2023     10/26/2023    K 4.1 10/26/2023    CO2 22 (L) 10/26/2023     10/26/2023    BUN 17 10/26/2023    CREATININE 1.40 10/26/2023     Lab Results   Component Value Date    ALT 29 08/25/2023    AST 24 08/25/2023    ALKPHOS 48 08/25/2023    BILITOT 0.3 08/25/2023     Lab Results   Component Value Date    WBC 13.2 (H) 10/26/2023    HGB 12.0 (L) 10/26/2023    HCT 38.1 (L) 10/26/2023    MCV 91 10/26/2023     10/26/2023     Lab Results   Component Value Date    CHOL 95 (L) 08/21/2023    CHOL 121 (L) 01/24/2023    CHOL 115 (L) 11/19/2021     Lab Results   Component Value Date    HDL 48 08/21/2023    HDL 48 01/24/2023    HDL 64 11/19/2021     Lab Results   Component Value Date    LDLCALC 35 (L) 08/21/2023    LDLCALC 55 (L) 01/24/2023    LDLCALC 45 (L) 11/19/2021     Lab Results   Component Value Date    TRIG 60 08/21/2023    TRIG 91 01/24/2023    TRIG 32 (L) 11/19/2021     No components  "found for: \"CHOLHDL\"  Lab Results   Component Value Date    HGBA1C 5.8 08/15/2023     Other labs not included in the list above were reviewed either before or during this encounter.    History    Past Medical History:   Diagnosis Date    Arthritis 09/12/2023    Arthritis 09/12/2023     rheumatology    B12 deficiency     Benign hypertensive kidney disease with chronic kidney disease stage I through stage IV, or unspecified(403.10)     Bilateral sensorineural hearing loss 09/12/2023    Depression with anxiety     [facility] for memory loss rec 9/23, 10/23.    Gastroesophageal reflux disease 09/12/2023    History of duodenal ulcer     Dr.Gallahger tolentino/aren up 10/23 rec. no NSAIDs    Pulmonary fibrosis (CMS/HCC) 09/12/2023    Screening for prostate cancer 10/26/2023    1/23 PSA 0.3    Spinal stenosis of lumbar region 09/12/2023    Stage 3a chronic kidney disease (CMS/HCC)     SPEP, renal u/S 4/19 nl.    Thyroid nodule     Dr..Bergun anguiano seen 5/23 no change U/S TSH nl 9/22     Past Surgical History:   Procedure Laterality Date    OTHER SURGICAL HISTORY  06/14/2021    Rotator cuff repair    OTHER SURGICAL HISTORY  06/14/2021    Hernia repair    OTHER SURGICAL HISTORY  09/22/2022    Eye surgery     Family History   Problem Relation Name Age of Onset    Hypertension Mother      Diabetes Mother      Other (Bladder Cancer) Father      Heart disease Father       Social History     Socioeconomic History    Marital status:      Spouse name: Not on file    Number of children: Not on file    Years of education: Not on file    Highest education level: Not on file   Occupational History    Not on file   Tobacco Use    Smoking status: Former     Types: Cigarettes     Passive exposure: Past    Smokeless tobacco: Never   Vaping Use    Vaping Use: Never used   Substance and Sexual Activity    Alcohol use: Never    Drug use: Yes     Types: Marijuana     Comment: sometimes    Sexual activity: Not on file "   Other Topics Concern    Not on file   Social History Narrative    Not on file     Social Determinants of Health     Financial Resource Strain: Not on file   Food Insecurity: Not on file   Transportation Needs: Not on file   Physical Activity: Not on file   Stress: Not on file   Social Connections: Not on file   Intimate Partner Violence: Not on file   Housing Stability: Not on file     Allergies   Allergen Reactions    Penicillins Chills    Budesonide Unknown    Codeine Unknown    Diclofenac Sodium Unknown    Diltiazem Hcl Other     gynecomastai    Metaxalone Unknown    Tramadol Unknown     Current Outpatient Medications on File Prior to Visit   Medication Sig Dispense Refill    ascorbic acid (Vitamin C) 500 mg tablet Take 1 tablet (500 mg) by mouth once daily.      cholecalciferol (Vitamin D-3) 50 mcg (2,000 unit) capsule Take 1 capsule (50 mcg) by mouth early in the morning..      cyanocobalamin (Vitamin B-12) 1,000 mcg tablet Take 1 tablet (1,000 mcg) by mouth once daily.      cyclobenzaprine (Flexeril) 5 mg tablet Take 1 tablet (5 mg) by mouth 2 times a day as needed for muscle spasms. 10 tablet 0    donepezil (Aricept) 5 mg tablet Take 1 tablet (5 mg) by mouth once daily at bedtime.      escitalopram (Lexapro) 10 mg tablet Take 1 tablet (10 mg) by mouth once daily.      gabapentin (Neurontin) 100 mg capsule Take 1 capsule (100 mg) by mouth 3 times a day. 2 tablets, TID      metoprolol succinate XL (Toprol-XL) 50 mg 24 hr tablet Take 1 tablet (50 mg) by mouth once daily.      pantoprazole (ProtoNix) 40 mg EC tablet Take 1 tablet (40 mg) by mouth once daily in the morning. Take before meals. Do not crush, chew, or split.      tamsulosin (Flomax) 0.4 mg 24 hr capsule Take 1 capsule (0.4 mg) by mouth once daily.       No current facility-administered medications on file prior to visit.     Immunization History   Administered Date(s) Administered    Flu vaccine, quadrivalent, high-dose, preservative free, age 65y+  (FLUZONE) 11/02/2020, 10/19/2022    Influenza, injectable, quadrivalent 10/17/2018, 10/25/2019    Moderna SARS-CoV-2 Vaccination 03/25/2021, 04/22/2021    Pneumococcal conjugate vaccine, 13-valent (PREVNAR 13) 02/15/2018    Pneumococcal polysaccharide vaccine, 23-valent, age 2 years and older (PNEUMOVAX 23) 10/25/2019    Tdap vaccine, age 7 year and older (BOOSTRIX) 05/04/2015, 06/22/2020     Patient's medical history was reviewed and updated either before or during this encounter.       Edward Gagnon MD

## 2024-01-09 NOTE — PATIENT INSTRUCTIONS
Get labs 1-2 weeks before follow up.      Diagnoses and all orders for this visit:  Pure hypercholesterolemia  -     Lipid Panel; Future  -     Comprehensive Metabolic Panel; Future  Thrombocytopenic disorder (CMS/HCC)  Stage 3a chronic kidney disease (CMS/HCC)  Pulmonary fibrosis (CMS/HCC)  Chronic obstructive pulmonary disease with acute exacerbation (CMS/HCC)  Spinal stenosis of lumbar region with neurogenic claudication  Comments:   #2 epidural 1/5/24. PT through [facility].  Gastroesophageal reflux disease without esophagitis  Moderate dementia without behavioral disturbance, psychotic disturbance, mood disturbance, or anxiety, unspecified dementia type (CMS/HCC)  Comments:   [facility].  Screening for prostate cancer  -     Prostate Specific Antigen; Future  Impaired fasting blood sugar  -     Hemoglobin A1C; Future  -     CBC; Future  Major depressive disorder with single episode, in partial remission (CMS/HCC)  Thyroid nodule  -     TSH with reflex to Free T4 if abnormal; Future  Benign hypertensive kidney disease with chronic kidney disease stage I through stage IV, or unspecified(403.10)  Comments:  BP doing OK  Other orders  -     Follow Up In Primary Care - Established

## 2024-02-05 ENCOUNTER — HOSPITAL ENCOUNTER (OUTPATIENT)
Dept: RADIOLOGY | Facility: HOSPITAL | Age: 74
Discharge: HOME | End: 2024-02-05
Payer: MEDICARE

## 2024-02-05 DIAGNOSIS — R41.3 OTHER AMNESIA: ICD-10-CM

## 2024-02-05 PROCEDURE — 70551 MRI BRAIN STEM W/O DYE: CPT

## 2024-03-11 ENCOUNTER — TELEPHONE (OUTPATIENT)
Dept: PRIMARY CARE | Facility: CLINIC | Age: 74
End: 2024-03-11
Payer: MEDICARE

## 2024-03-11 DIAGNOSIS — F32.9 MAJOR DEPRESSIVE DISORDER WITH SINGLE EPISODE, REMISSION STATUS UNSPECIFIED: ICD-10-CM

## 2024-03-11 DIAGNOSIS — K21.9 GASTROESOPHAGEAL REFLUX DISEASE WITHOUT ESOPHAGITIS: ICD-10-CM

## 2024-03-11 NOTE — TELEPHONE ENCOUNTER
LV 1/9/24, NV 8/9/24    Pantoprazole 40mg   Escitalopram 10mg, (asking for an increase, feels it;s not working as well as it should)     [facility] [address]

## 2024-03-12 RX ORDER — PANTOPRAZOLE SODIUM 40 MG/1
40 TABLET, DELAYED RELEASE ORAL
Qty: 90 TABLET | Refills: 2 | Status: SHIPPED | OUTPATIENT
Start: 2024-03-12

## 2024-03-12 RX ORDER — ESCITALOPRAM OXALATE 10 MG/1
10 TABLET ORAL DAILY
Qty: 90 TABLET | Refills: 2 | Status: SHIPPED | OUTPATIENT
Start: 2024-03-12

## 2024-04-08 DIAGNOSIS — I10 ESSENTIAL (PRIMARY) HYPERTENSION: ICD-10-CM

## 2024-04-08 RX ORDER — METOPROLOL SUCCINATE 50 MG/1
50 TABLET, EXTENDED RELEASE ORAL DAILY
Qty: 90 TABLET | Refills: 2 | Status: SHIPPED | OUTPATIENT
Start: 2024-04-08

## 2024-06-24 ENCOUNTER — APPOINTMENT (OUTPATIENT)
Dept: ENDOCRINOLOGY | Facility: CLINIC | Age: 74
End: 2024-06-24
Payer: MEDICARE

## 2024-08-27 ENCOUNTER — OFFICE VISIT (OUTPATIENT)
Dept: PRIMARY CARE | Facility: CLINIC | Age: 74
End: 2024-08-27
Payer: MEDICARE

## 2024-08-27 VITALS
TEMPERATURE: 96.8 F | HEART RATE: 78 BPM | BODY MASS INDEX: 28.79 KG/M2 | DIASTOLIC BLOOD PRESSURE: 70 MMHG | OXYGEN SATURATION: 96 % | HEIGHT: 68 IN | SYSTOLIC BLOOD PRESSURE: 120 MMHG | WEIGHT: 190 LBS

## 2024-08-27 DIAGNOSIS — I10 ESSENTIAL (PRIMARY) HYPERTENSION: ICD-10-CM

## 2024-08-27 DIAGNOSIS — N40.1 BENIGN PROSTATIC HYPERPLASIA WITH LOWER URINARY TRACT SYMPTOMS, SYMPTOM DETAILS UNSPECIFIED: ICD-10-CM

## 2024-08-27 DIAGNOSIS — M54.50 ACUTE LEFT-SIDED LOW BACK PAIN WITHOUT SCIATICA: ICD-10-CM

## 2024-08-27 DIAGNOSIS — F32.9 MAJOR DEPRESSIVE DISORDER WITH SINGLE EPISODE, REMISSION STATUS UNSPECIFIED: ICD-10-CM

## 2024-08-27 DIAGNOSIS — F03.B0 MODERATE DEMENTIA WITHOUT BEHAVIORAL DISTURBANCE, PSYCHOTIC DISTURBANCE, MOOD DISTURBANCE, OR ANXIETY, UNSPECIFIED DEMENTIA TYPE (MULTI): Primary | ICD-10-CM

## 2024-08-27 PROCEDURE — 99212 OFFICE O/P EST SF 10 MIN: CPT | Performed by: LICENSED PRACTICAL NURSE

## 2024-08-27 PROCEDURE — 3074F SYST BP LT 130 MM HG: CPT | Performed by: LICENSED PRACTICAL NURSE

## 2024-08-27 PROCEDURE — 1159F MED LIST DOCD IN RCRD: CPT | Performed by: LICENSED PRACTICAL NURSE

## 2024-08-27 PROCEDURE — 3078F DIAST BP <80 MM HG: CPT | Performed by: LICENSED PRACTICAL NURSE

## 2024-08-27 PROCEDURE — 1125F AMNT PAIN NOTED PAIN PRSNT: CPT | Performed by: LICENSED PRACTICAL NURSE

## 2024-08-27 PROCEDURE — 3008F BODY MASS INDEX DOCD: CPT | Performed by: LICENSED PRACTICAL NURSE

## 2024-08-27 PROCEDURE — 1036F TOBACCO NON-USER: CPT | Performed by: LICENSED PRACTICAL NURSE

## 2024-08-27 RX ORDER — METOPROLOL SUCCINATE 50 MG/1
50 TABLET, EXTENDED RELEASE ORAL DAILY
Qty: 90 TABLET | Refills: 0 | Status: SHIPPED | OUTPATIENT
Start: 2024-08-27

## 2024-08-27 RX ORDER — CYCLOBENZAPRINE HCL 5 MG
5 TABLET ORAL 2 TIMES DAILY PRN
Qty: 10 TABLET | Refills: 0 | Status: SHIPPED | OUTPATIENT
Start: 2024-08-27

## 2024-08-27 RX ORDER — ESCITALOPRAM OXALATE 10 MG/1
10 TABLET ORAL DAILY
Qty: 90 TABLET | Refills: 0 | Status: SHIPPED | OUTPATIENT
Start: 2024-08-27

## 2024-08-27 RX ORDER — TAMSULOSIN HYDROCHLORIDE 0.4 MG/1
0.4 CAPSULE ORAL DAILY
Qty: 90 CAPSULE | Refills: 0 | Status: SHIPPED | OUTPATIENT
Start: 2024-08-27

## 2024-08-27 RX ORDER — DONEPEZIL HYDROCHLORIDE 5 MG/1
5 TABLET, FILM COATED ORAL NIGHTLY
Qty: 90 TABLET | Refills: 0 | Status: SHIPPED | OUTPATIENT
Start: 2024-08-27

## 2024-08-27 ASSESSMENT — PATIENT HEALTH QUESTIONNAIRE - PHQ9
7. TROUBLE CONCENTRATING ON THINGS, SUCH AS READING THE NEWSPAPER OR WATCHING TELEVISION: NEARLY EVERY DAY
4. FEELING TIRED OR HAVING LITTLE ENERGY: SEVERAL DAYS
6. FEELING BAD ABOUT YOURSELF - OR THAT YOU ARE A FAILURE OR HAVE LET YOURSELF OR YOUR FAMILY DOWN: NOT AT ALL
1. LITTLE INTEREST OR PLEASURE IN DOING THINGS: NEARLY EVERY DAY
8. MOVING OR SPEAKING SO SLOWLY THAT OTHER PEOPLE COULD HAVE NOTICED. OR THE OPPOSITE, BEING SO FIGETY OR RESTLESS THAT YOU HAVE BEEN MOVING AROUND A LOT MORE THAN USUAL: NEARLY EVERY DAY
3. TROUBLE FALLING OR STAYING ASLEEP OR SLEEPING TOO MUCH: NEARLY EVERY DAY
5. POOR APPETITE OR OVEREATING: NOT AT ALL
9. THOUGHTS THAT YOU WOULD BE BETTER OFF DEAD, OR OF HURTING YOURSELF: SEVERAL DAYS
SUM OF ALL RESPONSES TO PHQ9 QUESTIONS 1 AND 2: 6
SUM OF ALL RESPONSES TO PHQ QUESTIONS 1-9: 17
10. IF YOU CHECKED OFF ANY PROBLEMS, HOW DIFFICULT HAVE THESE PROBLEMS MADE IT FOR YOU TO DO YOUR WORK, TAKE CARE OF THINGS AT HOME, OR GET ALONG WITH OTHER PEOPLE: SOMEWHAT DIFFICULT
2. FEELING DOWN, DEPRESSED OR HOPELESS: NEARLY EVERY DAY

## 2024-08-27 ASSESSMENT — PAIN SCALES - GENERAL: PAINLEVEL: 3

## 2024-08-27 NOTE — PROGRESS NOTES
[facility]: [facility]  PROGRESS NOTE      Kar Massey is a 74 y.o. male that is presenting today for Follow-up.      Subjective   Pt 74yr old male who presents to the office today for medication refills. 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 He has a PMH of HTN, COPD, BPH, HLD, moderate dementia and CKD. Mr. Massey reports that his chronic medical condition ars stable and that he is without any new complaints.         Review of Systems   All other systems reviewed and are negative.     Objective   Vitals:    08/27/24 1649   BP: 120/70   Pulse: 78   Temp: 36 °C (96.8 °F)   SpO2: 96%      Body mass index is 28.89 kg/m².  Physical Exam  Vitals reviewed.   Constitutional:       General: He is not in acute distress.     Appearance: Normal appearance. He is not ill-appearing, toxic-appearing or diaphoretic.   Cardiovascular:      Rate and Rhythm: Normal rate and regular rhythm.   Pulmonary:      Effort: Pulmonary effort is normal. No respiratory distress.      Breath sounds: Normal breath sounds. No stridor. No wheezing, rhonchi or rales.   Skin:     General: Skin is warm and dry.   Neurological:      Mental Status: He is alert.      Comments: Mild to moderate confusion       Diagnostic Results   Lab Results   Component Value Date    GLUCOSE 94 10/26/2023    CALCIUM 9.4 10/26/2023     10/26/2023    K 4.1 10/26/2023    CO2 22 (L) 10/26/2023     10/26/2023    BUN 17 10/26/2023    CREATININE 1.40 10/26/2023     Lab Results   Component Value Date    ALT 29 08/25/2023    AST 24 08/25/2023    ALKPHOS 48 08/25/2023    BILITOT 0.3 08/25/2023     Lab Results   Component Value Date    WBC 13.2 (H) 10/26/2023    HGB 12.0 (L) 10/26/2023    HCT 38.1 (L) 10/26/2023    MCV 91 10/26/2023     10/26/2023     Lab Results   Component Value Date    CHOL 95 (L) 08/21/2023    CHOL 121 (L) 01/24/2023    CHOL 115 (L) 11/19/2021     Lab Results   Component Value  "Date    HDL 48 08/21/2023    HDL 48 01/24/2023    HDL 64 11/19/2021     Lab Results   Component Value Date    LDLCALC 35 (L) 08/21/2023    LDLCALC 55 (L) 01/24/2023    LDLCALC 45 (L) 11/19/2021     Lab Results   Component Value Date    TRIG 60 08/21/2023    TRIG 91 01/24/2023    TRIG 32 (L) 11/19/2021     No components found for: \"CHOLHDL\"  Lab Results   Component Value Date    HGBA1C 5.8 08/15/2023     Other labs not included in the list above were reviewed either before or during this encounter.    History    Past Medical History:   Diagnosis Date    Arthritis 09/12/2023    Arthritis 09/12/2023     rheumatology    B12 deficiency     Benign hypertensive kidney disease with chronic kidney disease stage I through stage IV, or unspecified(403.10)     Bilateral sensorineural hearing loss 09/12/2023    Depression with anxiety     [facility] for memory loss rec 9/23, 10/23.    End of life care     DNR CC arrest 1/24 signed.    Gastroesophageal reflux disease 09/12/2023    History of duodenal ulcer      f/aren up 10/23 rec. no NSAIDs    Moderate dementia without behavioral disturbance, psychotic disturbance, mood disturbance, or anxiety (Multi) 01/09/2024     following, [facility], MRI brain ordered by neurology.     Pulmonary fibrosis (Multi) 09/12/2023    COPD 6/23 . yrly.    Screening for prostate cancer 10/26/2023    1/23 PSA 0.3    Spinal stenosis of lumbar region 09/12/2023    Stage 3a chronic kidney disease (Multi)     SPEP, renal u/S 4/19 nl.    Thyroid nodule     Dr..Bergun anguiano seen 5/23 no change U/S TSH nl 9/22     Past Surgical History:   Procedure Laterality Date    OTHER SURGICAL HISTORY  06/14/2021    Rotator cuff repair    OTHER SURGICAL HISTORY  06/14/2021    Hernia repair    OTHER SURGICAL HISTORY  09/22/2022    Eye surgery     Family History   Problem Relation Name Age of Onset    Hypertension Mother      Diabetes Mother      Other (Bladder Cancer) " Father      Heart disease Father       Social History     Socioeconomic History    Marital status:      Spouse name: Not on file    Number of children: Not on file    Years of education: Not on file    Highest education level: Not on file   Occupational History    Not on file   Tobacco Use    Smoking status: Former     Types: Cigarettes     Passive exposure: Past    Smokeless tobacco: Never   Vaping Use    Vaping status: Never Used   Substance and Sexual Activity    Alcohol use: Never    Drug use: Yes     Types: Marijuana     Comment: sometimes    Sexual activity: Not on file   Other Topics Concern    Not on file   Social History Narrative    Not on file     Social Determinants of Health     Financial Resource Strain: Not on file   Food Insecurity: Not on file   Transportation Needs: Not on file   Physical Activity: Not on file   Stress: Not on file   Social Connections: Not on file   Intimate Partner Violence: Not on file   Housing Stability: Not on file     Allergies   Allergen Reactions    Penicillins Chills    Budesonide Unknown    Codeine Unknown    Diclofenac Sodium Unknown    Diltiazem Hcl Other     gynecomastai    Metaxalone Unknown    Tramadol Unknown     Current Outpatient Medications on File Prior to Visit   Medication Sig Dispense Refill    ascorbic acid (Vitamin C) 500 mg tablet Take 1 tablet (500 mg) by mouth once daily.      cholecalciferol (Vitamin D-3) 50 mcg (2,000 unit) capsule Take 1 capsule (50 mcg) by mouth early in the morning..      cyanocobalamin (Vitamin B-12) 1,000 mcg tablet Take 1 tablet (1,000 mcg) by mouth once daily.      gabapentin (Neurontin) 100 mg capsule Take 1 capsule (100 mg) by mouth 3 times a day. 2 tablets, TID      pantoprazole (ProtoNix) 40 mg EC tablet Take 1 tablet (40 mg) by mouth once daily in the morning. Take before meals. Do not crush, chew, or split. 90 tablet 2    [DISCONTINUED] cyclobenzaprine (Flexeril) 5 mg tablet Take 1 tablet (5 mg) by mouth 2 times a  day as needed for muscle spasms. 10 tablet 0    [DISCONTINUED] donepezil (Aricept) 5 mg tablet Take 1 tablet (5 mg) by mouth once daily at bedtime.      [DISCONTINUED] escitalopram (Lexapro) 10 mg tablet Take 1 tablet (10 mg) by mouth once daily. 90 tablet 2    [DISCONTINUED] metoprolol succinate XL (Toprol-XL) 50 mg 24 hr tablet TAKE 1 TABLET BY MOUTH EVERY DAY 90 tablet 2    [DISCONTINUED] tamsulosin (Flomax) 0.4 mg 24 hr capsule Take 1 capsule (0.4 mg) by mouth once daily.       No current facility-administered medications on file prior to visit.     Immunization History   Administered Date(s) Administered    Flu vaccine, quadrivalent, high-dose, preservative free, age 65y+ (FLUZONE) 11/02/2020, 10/19/2022    Influenza, injectable, quadrivalent 10/17/2018, 10/25/2019    Moderna SARS-CoV-2 Vaccination 03/25/2021, 04/22/2021    Pneumococcal conjugate vaccine, 13-valent (PREVNAR 13) 02/15/2018    Pneumococcal polysaccharide vaccine, 23-valent, age 2 years and older (PNEUMOVAX 23) 10/25/2019    Tdap vaccine, age 7 year and older (BOOSTRIX, ADACEL) 05/04/2015, 06/22/2020     Patient's medical history was reviewed and updated either before or during this encounter.       Assessment/Plan   Problem List Items Addressed This Visit       Essential (primary) hypertension    Relevant Medications    metoprolol succinate XL (Toprol-XL) 50 mg 24 hr tablet    Major depressive disorder, single episode, unspecified    Relevant Medications    donepezil (Aricept) 5 mg tablet    escitalopram (Lexapro) 10 mg tablet    Moderate dementia without behavioral disturbance, psychotic disturbance, mood disturbance, or anxiety (Multi) - Primary    Acute left-sided low back pain without sciatica    Relevant Medications    cyclobenzaprine (Flexeril) 5 mg tablet    Benign prostatic hyperplasia with lower urinary tract symptoms    Relevant Medications    tamsulosin (Flomax) 0.4 mg 24 hr capsule     Mr. Massey's exam is positive for mild to  moderate confusion. VS stable. I will refill his medications including Aricept. He will be transferring to the [facility] going forward and is scheduled to be seen to establish care next month.   Florence Pyle, MIKHAIL-CNP